# Patient Record
Sex: FEMALE | Race: WHITE | Employment: FULL TIME | URBAN - METROPOLITAN AREA
[De-identification: names, ages, dates, MRNs, and addresses within clinical notes are randomized per-mention and may not be internally consistent; named-entity substitution may affect disease eponyms.]

---

## 2018-02-20 ENCOUNTER — HOSPITAL ENCOUNTER (EMERGENCY)
Age: 48
Discharge: HOME | End: 2018-02-20
Attending: FAMILY MEDICINE

## 2018-02-20 VITALS
WEIGHT: 200.3 LBS | SYSTOLIC BLOOD PRESSURE: 138 MMHG | HEIGHT: 65 IN | DIASTOLIC BLOOD PRESSURE: 82 MMHG | OXYGEN SATURATION: 98 % | HEART RATE: 98 BPM | RESPIRATION RATE: 18 BRPM | BODY MASS INDEX: 33.37 KG/M2 | TEMPERATURE: 97.1 F

## 2018-02-20 DIAGNOSIS — L98.9 SKIN LESION: ICD-10-CM

## 2018-02-20 DIAGNOSIS — L08.9 SKIN INFECTION: Primary | ICD-10-CM

## 2018-02-20 RX ORDER — AMOXICILLIN AND CLAVULANATE POTASSIUM 875; 125 MG/1; MG/1
1 TABLET, FILM COATED ORAL 2 TIMES DAILY
Qty: 14 TAB | Refills: 0 | Status: SHIPPED | OUTPATIENT
Start: 2018-02-20 | End: 2018-02-27

## 2018-02-20 RX ORDER — PREDNISONE 20 MG/1
TABLET ORAL
Qty: 9 TAB | Refills: 0 | Status: SHIPPED | OUTPATIENT
Start: 2018-02-20

## 2018-02-20 RX ORDER — SULFAMETHOXAZOLE AND TRIMETHOPRIM 800; 160 MG/1; MG/1
1 TABLET ORAL 2 TIMES DAILY
Qty: 14 TAB | Refills: 0 | Status: SHIPPED | OUTPATIENT
Start: 2018-02-20 | End: 2018-02-27

## 2018-02-20 RX ORDER — BUPROPION HYDROCHLORIDE 300 MG/1
300 TABLET ORAL
COMMUNITY

## 2018-02-20 RX ORDER — ALPRAZOLAM 0.5 MG/1
0.5 TABLET ORAL DAILY
COMMUNITY

## 2018-02-20 NOTE — LETTER
NOTIFICATION RETURN TO WORK / SCHOOL 
 
2/20/2018 11:51 AM 
 
Ms. Byron Richardson [address] [place] [address] To Whom It May Concern: 
 
Byron Richardson is currently under the care of [address] [place] [address]. She will return to work/school on: 02/21 or 20/22/2018 If there are questions or concerns please have the patient contact our office. Sincerely, Jumana Manley NP

## 2018-02-20 NOTE — DISCHARGE INSTRUCTIONS
Skin infection: After Your Visit to the Emergency Room  Your Care Instructions  You were seen in the emergency room because you had a skin abscess. This is a bacterial infection that forms a pocket of pus. It is also known as a \"boil. \"  The doctor may have cut a small opening in the abscess so that the pus can drain out. You may have gauze in the cut so that the abscess will stay open and keep draining. You may need antibiotics. You will need to follow up with your doctor to make sure the infection has gone away. Even though you have been released from the emergency room, you still need to watch for any problems. The doctor carefully checked you. But sometimes problems can develop later. If you have new symptoms, or if your symptoms do not get better, return to the emergency room or call your doctor right away. A visit to the emergency room is only one step in your treatment. Even if you feel better, you still need to do what your doctor recommends, such as going to all suggested follow-up appointments and taking medicines exactly as directed. This will help you recover and help prevent future problems. How can you care for yourself at home? · If you were given antibiotics, take them as directed. Do not stop taking them just because you feel better. You need to take the full course of antibiotics. · Take pain medicines exactly as directed. If the doctor gave you a prescription medicine for pain, take it as prescribed. If you are not taking a prescription pain medicine, ask your doctor if you can take an over-the-counter medicine. · Apply a heating pad set on low or a hot water bottle 3 or 4 times a day for pain. Keep a cloth between the heat source and your skin. · Keep your bandage clean and dry. Change the bandage whenever it gets wet or dirty, or at least one time a day. · If the abscess was packed with gauze:  ¨ Keep follow-up appointments to have the gauze changed or removed.  If your doctor instructed you to remove the gauze, gently pull out all of the gauze when your doctor tells you to. ¨ After the gauze is removed, soak the area in warm water for 15 to 20 minutes two times a day, until the wound closes. When should you call for help? Call your doctor today if:  · You think the abscess is getting worse. · You have a new or higher fever. · Your pain gets worse. · You have any problems with the gauze in your abscess. Where can you learn more? Go to ralali.be  Enter M703 in the search box to learn more about \"Skin Abscess: After Your Visit to the Emergency Room. \"   © 3580-6320 [employer]. Care instructions adapted under license by Jaliyn Hernandez (which disclaims liability or warranty for this information). This care instruction is for use with your licensed healthcare professional. If you have questions about a medical condition or this instruction, always ask your healthcare professional. [place] [address] any warranty or liability for your use of this information. Content Version: 9.4.86989;  Last Revised: September 29, 2010

## 2018-02-20 NOTE — UC PROVIDER NOTE
HPI Comments:     Patient here with red rash that popped up on face 2 days ago. Started under her eyes and has spread down her cheeks. It is painful to mildly irritated. No known aggravating or eliciting factors. No other associated symptoms. Recent breaks in skin: 2 weeks ago noticed bump on chin scratched and kept bleeding has since stopped bleeding. No fever or chills, no nausea, vomiting, weakness sweats or feelings of being ill. Hasnt taken any medications. Mentions past of \"skin inflammation\" outbreak on arm but has never been on face. Denies any known autoimmune disorders. Is undergoing eval by ortho for neck pain currently. Patient is a 41 y.o. female presenting with facial pain. Facial Pain    Pertinent negatives include no vomiting and no weakness. History reviewed. No pertinent past medical history. Past Surgical History:   Procedure Laterality Date    HX GYN      hysterectomy         History reviewed. No pertinent family history. Social History     Social History    Marital status:      Spouse name: N/A    Number of children: N/A    Years of education: N/A     Occupational History    Not on file. Social History Main Topics    Smoking status: Former Smoker    Smokeless tobacco: Not on file    Alcohol use Not on file    Drug use: Not on file    Sexual activity: Not on file     Other Topics Concern    Not on file     Social History Narrative                ALLERGIES: Review of patient's allergies indicates no known allergies. Review of Systems   Constitutional: Negative for appetite change, chills, diaphoresis, fatigue and fever. Eyes: Negative for visual disturbance. Respiratory: Negative for chest tightness, shortness of breath and wheezing. Cardiovascular: Negative for chest pain and palpitations. Gastrointestinal: Negative for nausea and vomiting. Musculoskeletal: Negative for myalgias. Skin: Negative for pallor.    Neurological: Negative for dizziness, weakness, light-headedness and headaches. Vitals:    02/20/18 1055   BP: 138/82   Pulse: 98   Resp: 18   Temp: 97.1 °F (36.2 °C)   SpO2: 98%   Weight: 90.9 kg (200 lb 4.8 oz)   Height: 5' 5\" (1.651 m)       Physical Exam   Constitutional: She is oriented to person, place, and time. No distress. Non-toxic appearing, well hydrated   HENT:   Nose: Nose normal.   Mouth/Throat: Oropharyngeal exudate present. TMs normal   Eyes: Conjunctivae and EOM are normal. Pupils are equal, round, and reactive to light. No scleral icterus. Neck: Normal range of motion. Neck supple. Cardiovascular: Normal rate, regular rhythm and normal heart sounds. Exam reveals no gallop and no friction rub. No murmur heard. Apical pulse 82 bpm   Pulmonary/Chest: Effort normal and breath sounds normal. No respiratory distress. She has no wheezes. She has no rales. Good air movement througout   Abdominal: Soft. Bowel sounds are normal. She exhibits no distension and no mass. There is no tenderness. There is no rebound and no guarding. Lymphadenopathy:     She has no cervical adenopathy. Neurological: She is alert and oriented to person, place, and time. No cranial nerve deficit. Skin: Skin is warm and dry. She is not diaphoretic. No pallor. Solidly erythematous smooth well demarcated rash on both cheeks. It is warm to touch. Minimal discomfort. No underlying mass or abscess. No palpable sinus pain or tenderness. No local LAD. No discharge. No peeling skin or erosions seen. 1 mm x 1 mm shiny papule on chin midline   Psychiatric: She has a normal mood and affect. Her behavior is normal. Thought content normal.   Nursing note and vitals reviewed.       MDM     Differential Diagnosis; Clinical Impression; Plan:     CLINICAL IMPRESSION:  (L08.9) Skin infection  (primary encounter diagnosis)  (L98.9) Skin lesion    Orders Placed This Encounter  RX      amoxicillin-clavulanate (AUGMENTIN) 875-125 mg per tablet      trimethoprim-sulfamethoxazole (BACTRIM DS) 160-800 mg per tablet      predniSONE (DELTASONE) 20 mg tablet    Will cover for suspected erysipelas. VS stable no fevers. No indication for immediate referral to ED or for IV antibiotics at this time. DDX include malar rash, impetigo, contact dermatitis, seb derm      Plan:  1. See above orders. 2. Follow up for re eval in 48 to 72 hours. 3. Go to Emergency Department Immediately for new or worsening especially fever, chills, n/v, weakness, spread of rash or without response within next 2 days to antibiotic    We have reviewed concerning signs/symptoms, normal vs abnormal progression of medical condition and when to seek immediate medical attention. Risk of Significant Complications, Morbidity, and/or Mortality:   Presenting problems: Moderate  Diagnostic procedures: Moderate  Management options:   Moderate  Progress:   Patient progress:  Stable      Procedures